# Patient Record
Sex: FEMALE | Race: WHITE
[De-identification: names, ages, dates, MRNs, and addresses within clinical notes are randomized per-mention and may not be internally consistent; named-entity substitution may affect disease eponyms.]

---

## 2021-09-28 ENCOUNTER — HOSPITAL ENCOUNTER (EMERGENCY)
Dept: HOSPITAL 35 - ER | Age: 76
LOS: 1 days | Discharge: PSYCHIATRIC OR SUBSTANCE USE FACILITY | End: 2021-09-29
Payer: COMMERCIAL

## 2021-09-28 VITALS — WEIGHT: 180.01 LBS | BODY MASS INDEX: 33.13 KG/M2 | HEIGHT: 62 IN

## 2021-09-28 DIAGNOSIS — Z88.6: ICD-10-CM

## 2021-09-28 DIAGNOSIS — Z88.5: ICD-10-CM

## 2021-09-28 DIAGNOSIS — Z88.2: ICD-10-CM

## 2021-09-28 DIAGNOSIS — Z20.822: ICD-10-CM

## 2021-09-28 DIAGNOSIS — Z88.9: ICD-10-CM

## 2021-09-28 DIAGNOSIS — F91.9: Primary | ICD-10-CM

## 2021-09-29 ENCOUNTER — HOSPITAL ENCOUNTER (INPATIENT)
Dept: HOSPITAL 35 - SBH | Age: 76
LOS: 8 days | Discharge: HOME | DRG: 885 | End: 2021-10-07
Attending: PSYCHIATRY & NEUROLOGY | Admitting: PSYCHIATRY & NEUROLOGY
Payer: COMMERCIAL

## 2021-09-29 VITALS — SYSTOLIC BLOOD PRESSURE: 111 MMHG | DIASTOLIC BLOOD PRESSURE: 54 MMHG

## 2021-09-29 VITALS — SYSTOLIC BLOOD PRESSURE: 146 MMHG | DIASTOLIC BLOOD PRESSURE: 88 MMHG

## 2021-09-29 VITALS — BODY MASS INDEX: 28.17 KG/M2 | WEIGHT: 165 LBS | HEIGHT: 64 IN

## 2021-09-29 VITALS — DIASTOLIC BLOOD PRESSURE: 82 MMHG | SYSTOLIC BLOOD PRESSURE: 136 MMHG

## 2021-09-29 DIAGNOSIS — F41.9: ICD-10-CM

## 2021-09-29 DIAGNOSIS — Z60.2: ICD-10-CM

## 2021-09-29 DIAGNOSIS — Z88.6: ICD-10-CM

## 2021-09-29 DIAGNOSIS — Z90.710: ICD-10-CM

## 2021-09-29 DIAGNOSIS — Z79.899: ICD-10-CM

## 2021-09-29 DIAGNOSIS — Z23: ICD-10-CM

## 2021-09-29 DIAGNOSIS — K21.9: ICD-10-CM

## 2021-09-29 DIAGNOSIS — E53.8: ICD-10-CM

## 2021-09-29 DIAGNOSIS — Z88.8: ICD-10-CM

## 2021-09-29 DIAGNOSIS — F33.2: Primary | ICD-10-CM

## 2021-09-29 DIAGNOSIS — E89.0: ICD-10-CM

## 2021-09-29 DIAGNOSIS — Z86.16: ICD-10-CM

## 2021-09-29 DIAGNOSIS — I10: ICD-10-CM

## 2021-09-29 DIAGNOSIS — Z88.2: ICD-10-CM

## 2021-09-29 DIAGNOSIS — Z20.822: ICD-10-CM

## 2021-09-29 LAB
CHOLEST SERPL-MCNC: 189 MG/DL (ref ?–200)
FOLATE SERPL-MCNC: 7.6 NG/ML (ref 8.6–58.9)
HDLC SERPL-MCNC: 51 MG/DL (ref 40–?)
LDLC SERPL-MCNC: 123 MG/DL (ref ?–100)
TC:HDL: 3.7 RATIO
TRIGL SERPL-MCNC: 76 MG/DL (ref ?–150)
VIT B12 SERPL-MCNC: 166 PG/ML (ref 193–986)
VLDLC SERPL CALC-MCNC: 15 MG/DL (ref ?–40)

## 2021-09-29 PROCEDURE — 10880: CPT

## 2021-09-29 SDOH — SOCIAL STABILITY - SOCIAL INSECURITY: PROBLEMS RELATED TO LIVING ALONE: Z60.2

## 2021-09-29 NOTE — NUR
New admit to [facility]: Pt transferred from [facility] for admit to [facility]. She
is noted with hx anxiety and depression, HTN, GERD, hypothyroidism. She
reports that her and her  both had covid in Oct last year and that in
February, her  passed away. Increased depression since. Not able to
obtain weight hx d/t pt teary eyed at time of visit. 100% intake at [facility] this
morning. Current BMI 39, obese class II. Follow weight and intake trends for
nutrition intervention needs. Low nutrition risk at this time.

## 2021-09-29 NOTE — NUR
PATIENT HAS BEEN UP, AND OUT ON THE UNIT, AMBULATE WITH ASSIST OF ROLLER
WALKER, GAIT SLIGHTLY UNSTEADY. PATIENT TOOK MORNING MEDICATION WHOLE WITHOUT
DIFFICULTY, SHE IS EATING MEALS, AND DRINKING FLUID WELL. PATIENT DENIES
SUICIDAL/HOMICIDAL IDEATION. SHE RATES DEPRESSION 2/10, "MY  PASSED
AWAY IN FEBRUARY". PATIENT ANXIETY IS "NOT THAT MUCH". SHE DENIES HAVING
PHYSICAL PAIN AT THIS TIME. PATIENT WAS EMOTIONAL DURING 1:1 CONVERSATION.
AFFECT IS SAD, MOOD IS DEPRESSION. NO SIGN OF ACUTE DISTRESS NOTED, WILL
MONITOR FOR SAFETY.
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
## 2021-09-29 NOTE — NUR
PATIENT WAS ADMITTED TO [facility] ON 9/29/21 AT 0433. PT WAS COOPERATIVE WITH
ADMISISON PROCESS. PT SIGNED VOLUNTARY CONSENT TO TREATMENT AND FALL CONTRACT.
PT STATED HER  PASSED AWAY IN FEBRUARY. PT STATED SHE CONTRACTED COVID
IN OCTOBER 2020 AND HAS HAD DEPRESSION AND ANXIETY SINCE. PT ENDORSED SI WITH
NO PLAN. PT CONTRACTED FOR SAFETY WITH RN. PT DENIED HI AND AVH. PT STATED SHE
IS NOT SURE WHEY SHE OVERDOSED AND STATES SHE ONLY TOOK A DOUBLE DOSE OF HER
MEDICATION. PT AMBULATES WITH WALKER BUT IS WEAK. FALL PRECAUTIONS ARE IN
PLACE. VITAL SIGNS OBTAINED; WITHIN NORMAL LIMITS. SKIN ASSESSMENT PERFORMED;
PT HAD A "RED MICHAEL" REMOVED FROM HER LEFT EAR. NO PURULENT DRAINAGE FROM LEFT
EAR; DRESSED WITH TAPE.

## 2021-09-30 VITALS — SYSTOLIC BLOOD PRESSURE: 140 MMHG | DIASTOLIC BLOOD PRESSURE: 81 MMHG

## 2021-09-30 VITALS — SYSTOLIC BLOOD PRESSURE: 152 MMHG | DIASTOLIC BLOOD PRESSURE: 78 MMHG

## 2021-09-30 VITALS — DIASTOLIC BLOOD PRESSURE: 81 MMHG | SYSTOLIC BLOOD PRESSURE: 140 MMHG

## 2021-09-30 LAB
EST. AVERAGE GLUCOSE BLD GHB EST-MCNC: 114 MG/DL
GLYCOHEMOGLOBIN (HGB A1C): 5.6 % (ref 4.8–5.6)

## 2021-09-30 NOTE — NUR
Took over care from night shift staff this am. Client was present in room
sitting on bed. Client expressed that she wanted to go home, and expressed
that she thought her doctor told her that she could go home this weekend.
Nursing informed client that discharges did not happen on weekends and that
only provider could discharge client. Client denies any si/hi/hallucinations
at this time. Mild anxiety expressed. Alert and oriented x3. Bowel sounds
normal. Lung sounds clear bilaterally.

## 2021-09-30 NOTE — NUR
JUDSON and Dr. Rojas met with the Pt.  Pt denied SI/HI.  SW asked Pt about her
concerns with going home.  Pt stated she was aware that she would not go home
today.  Pt asked if SW called her daughter, Mackenzie, today.  SW explained that
we planned to call Mackenzie during this meeting.
 
Dr. Rojas called Mackenzie on speaker phone.  After Dr. Rojas annouced who
was in the room Mackenzie begin to yell very loudly "I was told my mother would
be there for 10 day no visits no phone calls and you are saying she can
leave".  Pt tried to calm Mackenzie down.  Mackenzie yelled at the Pt, " No Fuck
you Mom!" and continued to berate and cuss out JUDSON and Dr. Cervantes.  Dr. Rojas calmly explained that the phone call would be ended due to the
language and yelling.  Also that if it continued we could not include her in
the Pt's treatment while on the unit.  Mackenzie yells, " I have anxiety and I
don't want to be apart of her treatment anymore!".  Phone call was ended.
 
JUDSON completed debriefing with the Pt and offered emotional support.
 
JUDSON will continue to follow.

## 2021-09-30 NOTE — H
[facility]
[address]
[place]   [address]                     HISTORY AND PHYSICAL          
_______________________________________________________________________________
 
Name:       CHANNING KOROMA         Room #:         521A-A      ADM IN  
M.R.#:      [id number]                       Account #:      [id number]  
Admission:  21    Attend Phys:    Ethan Logan DO
Discharge:              Date of Birth:  [birth year]  
                                                          Report #: [id number]
                                                                    [id number] 
_______________________________________________________________________________
THIS REPORT FOR:  
 
cc:  Odalis Bartholomew MD, Emily G. MD Kerstein,Ethan LE DO                                        ~
 
DATE OF SERVICE: 2021
 
INPATIENT PSYCHIATRIC EVALUATION
 
ATTENDING PSYCHIATRIST:  Ethan Logan DO
 
MEDICAL CONSULTANT:  RADHAMES Fields and Antonio Humphrey MD and his hospitalist
team.
 
SOURCES OF INFORMATION:  Records from [facility]; interview with
the patient; collateral from her daughter, Mackenzie Yuan, at [phone].
 
REASON FOR ADMISSION:  Overdose on 53 Seroquel, 30 Xanax, admitted  at [facility].
 
CHIEF COMPLAINT: "I want to sign out."
 
HISTORY OF PRESENT ILLNESS:  This is a 75-year-old obese  female.  The 
patient was admitted on , , at [facility].  Apparently, the 
patient had been diagnosed with depression, on no medication and had been 
hospitalized in 2020 for suicidal ideation, though she denied this.  Per the 
ER record, the patient was sleepy and she reported depression and overwhelmed 
feelings with no hopeless or helpless feelings.  She has poor motivation and 
insight and she has poor sleep and appetite.  The patient denies any active 
suicidal or homicidal ideation; however, she was intentionally overdosed with 
Xanax, Tylenol, lisinopril, and Seroquel.  The patient took all her medication 
overnight.  She was found by her caregiver the next day in the morning.  The 
patient has been depressed for a long time; however, depression has been getting
worse in the last year and a half after she got COVID and her  .  The
patient denied any manic or psychotic features.  She lives alone with in-home 
caregiver.
 
ALLERGIES:  PROCHLORPERAZINE, DROPERIDOL, FLAGYL, MORPHINE SULFATE, 
SULFAMETHOXAZOLE, TORADOL, AND SULFA DRUGS
 
LABORATORY DATA FROM [facility]: White count 5.85, hemoglobin 14.8, 
hematocrit 45, platelet count 149.  Sodium 141, potassium 4.3, chloride 110, 
bicarbonate 24, BUN 18, creatinine 0.6, calcium 9.2, total serum protein 5.8, 
alkaline phosphatase 56, ALT 16, AST 24, glucose 79.
 
 
 
 
[address]   [address]                     HISTORY AND PHYSICAL          
_______________________________________________________________________________
 
Name:       CHANNING KOROMA         Room #:         521A-A      [facility] [place]  
M.R.#:      [id number]                       Account #:      [id number]  
Admission:  21    Attend Phys:    Ethan Loagn DO
Discharge:              Date of Birth:  [birth year]  
                                                          Report #: [id number]
                                                                    [id number] 
_______________________________________________________________________________
 
SOCIAL HISTORY:  The patient is .  She was  from her first 2 
husbands. No smoking.  No vaping.  No alcohol. No drug use history.  She has 
difficulty paying living expenses, food insecurity, worried about running out of
food in last year.  Actually, I do not think those were responded to those last 
few items.
 
Diagnosed with major depressive disorder, severe degree, seen by Dr. Baudilio Nails. 
Additional information from [facility]; she took 53, 25 mg Seroquel and 30, 0.5 
mg Xanax last night around 19:30, so that would be on .  She arrived to the
ER 15-1/2 hours later.
 
SURGICAL HISTORY:  Dilation and curettage of uterus, gallbladder surgery, 
hysterectomy, tubal ligation.
 
MEDICAL HISTORY:  Includes essential hypertension, GERD, hyponatremia, likely 
SIADH, hypothyroidism.  She was somnolent and difficult to arouse at [facility].
 EKG showed QTc 474, .  WA interval 156, nonspecific ST and T-wave 
changes, normal sinus rhythm.
 
Labs showed SARS-CoV-2 negative.  TSH 0.91, sodium 141, potassium 4.1, chloride 
110, bicarbonate 24, anion gap 8, calcium 9.7, glucose 79, total protein 5.8, 
BUN 18, creatinine 0.6.  White blood cell count 5.05, H and H 14.8 and 45, 
platelet count 149.  UDS was positive for benzodiazepines.  Sodium 138, 
potassium 4.3, chloride 105, bicarbonate 25, calcium 9.3, glucose 90, alkaline 
phosphatase 57, ALT 18, AST 26.
 
CURRENT MEDICATIONS:  In the hospital, MiraLax 17 grams daily, lactulose 20 mg 
daily, mirtazapine was started at 22.5 mg at bedtime, melatonin 6 mg at bedtime 
for sleep, losartan 50 mg oral daily for hypertension, Protonix 40 mg oral daily
for GERD, levothyroxine 75 mcg oral daily, otherwise house PRNs.
 
LABORATORY DATA:  Today, triglycerides 76, cholesterol 199, , HDL 51.  
COVID-19 PCR here was again not detected.
 
PHYSICAL EXAMINATION:
VITAL SIGNS:  Today, temperature 36.9, pulse 82, respirations 17, /88, O2 
sat 96%.  BMI 39.  Weight 96.6 kilos, height 155.48 cm.
MUSCULOSKELETAL:  Ambulates with walker, a bit kyphotic.
 
MENTAL STATUS EXAMINATION:  This is a somewhat ill-appearing  female.  
Attention fair.  Concentration fair.  Speech normal in rate, volume, and tone.  
Thought process linear and goal directed.  Thought content focused on 
ameliorating her situation.  Denied suicidal intent or plan.  Denied homicidal 
intent or plan.  Some helplessness. Some hopelessness.  Memory not formally 
tested.  Insight and judgment were limited.  Fund of knowledge is average.
 
 
 
[facility]
[address]
[place]   [address]                     HISTORY AND PHYSICAL          
_______________________________________________________________________________
 
Name:       CHANNING KOROMA         Room #:         521A-A      ADM IN  
.R.#:      [id number]                       Account #:      [id number]  
Admission:  21    Attend Phys:    Ethan Logan DO
Discharge:              Date of Birth:  [birth year]  
                                                          Report #: [id number]
                                                                    [id number] 
_______________________________________________________________________________
 
 
Some additional notes; she has an associate's degree from [employer].  
Born and raised in [place], [place].  Her [employer]ism is [employer].  her primary care physician is who treats her psychiatric issues.
 
FORMULATION:  A 75-year-old  female, admitted status post intentional 
overdose to end her life.  The patient has had a difficult year or so including 
death of her , multiple hospitalizations, sequela from COVID.  She 
appears to be a long hauler.
 
PLAN:  The patient is admitted voluntarily to [facility] 
Behavioral Health Unit. Evaluate, stabilize, obtain collateral. Regarding next 
steps for this patient, she will need to complete the milieu therapy.  I think 
she would be a reasonable candidate for a PHP program.  I discussed this with 
her.  We will get her going on mirtazapine, which has been successful in the 
past I explained of, Xanax due to the overdose potential.
 
Time spent on this case approximately 60-70 minutes, greater than 50% of the 
time spent in review of records and coordination of care.
 
STRENGTHS:  She is insured, some family support.
 
WEAKNESSES:  Poor coping skills, multiple morbidities.
 
 
 
 
 
 
 
 
 
 
 
 
 
 
 
 
 
 
 
 
  <ELECTRONICALLY SIGNED>
   By: Ethan Logan DO        
  21     2226
D: 21 1336                           _____________________________________
T: 21 1652                           Ethan Logan DO          /nt

## 2021-10-01 VITALS — SYSTOLIC BLOOD PRESSURE: 138 MMHG | DIASTOLIC BLOOD PRESSURE: 62 MMHG

## 2021-10-01 VITALS — SYSTOLIC BLOOD PRESSURE: 158 MMHG | DIASTOLIC BLOOD PRESSURE: 91 MMHG

## 2021-10-01 VITALS — DIASTOLIC BLOOD PRESSURE: 91 MMHG | SYSTOLIC BLOOD PRESSURE: 158 MMHG

## 2021-10-01 LAB
BACTERIA-REFLEX: (no result) /HPF
BILIRUB UR-MCNC: NEGATIVE MG/DL
COLOR UR: YELLOW
KETONES UR STRIP-MCNC: (no result) MG/DL
RBC # UR STRIP: (no result) /UL
RBC #/AREA URNS HPF: (no result) /HPF
SP GR UR STRIP: 1.01 (ref 1–1.03)
SQUAMOUS: (no result) /LPF (ref 0–3)
URINE CLARITY: (no result)
URINE GLUCOSE-RANDOM*: NEGATIVE
URINE LEUKOCYTES-REFLEX: (no result)
URINE NITRITE-REFLEX: POSITIVE
URINE PROTEIN (DIPSTICK): NEGATIVE
UROBILINOGEN UR STRIP-ACNC: 0.2 E.U./DL (ref 0.2–1)

## 2021-10-01 NOTE — NUR
JUDSON was able to speak with the Pt daughter, Leonor [phone].  Leonor did
state she was not listed on the Pt's DPOA,  However Leonor was able to offer
her concerns about the Pt.  Leonor stated that the Pt has been hospitalized
prior and when discharged Pt refuses mental health services.  Pt wont leave
her home until she has an appointment.  Pt has in home aides 8 hours a day 5
days a week.  Pt dosent do well when left alone a home.  Pt's anxiety
increases and Pt makes sucidal statements.  This has increase since the
passing of the Pt's .  The family has tried to work with Pt on getting
into a nursing home however Pt refuses to go.  Leonor had no other questions
or concerns at this time.
 
JUDSON will continue to follow
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
## 2021-10-01 NOTE — NUR
RESUMMED CARE FROM OVERNIGHT SHIFT THIS AM, PATIENT IN ROOM LYING QUIET.
PATIENT TOOK MEDICATION ATE SMALL AMOUNT OF BREAKFAST, PATIENTS AFFECT FLAT.
PATIENT DENIES SI/HI/AH/VH AT PRESENT. PATIENTS ABDOMEN SOFT BOWEL SOUNDS
PRESENT PATIENTS LUNGS CLEAR. PATIENT IS WORRIED THAT HER DAUGHTER WILL NOT BE
ABLE TO HELP HER AT HOME. PATIENT THIS AM COMPLAINED OF NAUSEA AND UPSET
STOMACH. PATIENT GIVEN ZOFRAN 4 MG AND SPRITE PATIENT CAME OUT OF ROOM AND WAS
WALKING THE HALLS FOR EXERCISE. PATIENT HAS NOT DISPLAYED ANY BEHAVIORS WILL
CONTINUE TO MONITOR PATIENT FOR SAFETY AND BEHAVIORS.

## 2021-10-02 VITALS — DIASTOLIC BLOOD PRESSURE: 86 MMHG | SYSTOLIC BLOOD PRESSURE: 165 MMHG

## 2021-10-02 VITALS — SYSTOLIC BLOOD PRESSURE: 141 MMHG | DIASTOLIC BLOOD PRESSURE: 69 MMHG

## 2021-10-02 VITALS — DIASTOLIC BLOOD PRESSURE: 69 MMHG | SYSTOLIC BLOOD PRESSURE: 141 MMHG

## 2021-10-02 NOTE — NUR
Pt called nursing at 3:28pm, stating that her back hurt. Pt stated that it
hurt to move around in bed. Repositioning was done to help alleviate pain at
this time. Pt has already received tylenol prn, and new order for tylenol
1000mg was entered after approval by provider. Pt stated that she felt like
she should move, but didn't want to. Pt was encouraged to rest at this time.
No further discomfort noted.

## 2021-10-02 NOTE — NUR
PT has been complaining of backpain during this shift in addition to her
nausea. Pt was given tylenol to help with backpain, but states that the
tylenol wore off and that it is no longer helpful. As of 12:51pm, pt stated,
"this is worse than labor pains, because at least labor pains end." Nursing
requested lidocaine patch from rounding provider to help alleviate pt pain. Pt
is currently in bed with two side rails up for safety.

## 2021-10-02 NOTE — NUR
Resummed care from overnight staff this am. Pt was sitting in her room,
stating that she was nauseous. Pt was positioned in side laying position, and
encouraged to eat bland foods as pt had been given prn zofran earlier in the
am. Pt oriented 4x. Pt denies si/hi/hallucinations at this time. Lung sounds
clear. Bowel sounds were normal and present. No further concerns at this time.
Will continue to monitor for patient care and safety.

## 2021-10-02 NOTE — NUR
PATIENT RATES HER DEPRESSION A 7/10 AND HER ANXIETY A 3/10 BUT DENIES SI/HI.
 PASSED AWAY ABOUT A YEAR AGO AND PATIENT HAS BEEN IN AND OUT OF
HOSPITALS FOR VARIOUS MEDICAL REASONS AND SHE FEELS THAT IT HAS BEEN VERY
STRESSFUL. PATIENT GIVEN PRN VISTRAIL 25MG FOR ANXIETY AS HS AND ZOFRAN LATER
FOR NAUSEA. PATIENT AMBULATES WITH WALKER, IS CONTINENT OF BOWEL AND BLADDER.

## 2021-10-02 NOTE — NUR
Pt called for nursing again at 12:58pm. Nursing informed pt that nursing was
still waiting on any additional orders provider would give. Pt stated "why
don't you just shot me." Nursing reassured pt during this time; no suicidal
intent was found.

## 2021-10-03 VITALS — SYSTOLIC BLOOD PRESSURE: 135 MMHG | DIASTOLIC BLOOD PRESSURE: 73 MMHG

## 2021-10-03 VITALS — DIASTOLIC BLOOD PRESSURE: 73 MMHG | SYSTOLIC BLOOD PRESSURE: 135 MMHG

## 2021-10-03 NOTE — NUR
PT ALERT AND ORIENTED TIMES FOUR. VSS. PT C/O PAIN AND ANXIETY BOTH PRN
MEDICATIONS GIVEN WITH GOOD RELEIF. PT DENIES SI/HI. PT OBSERVED HAVING AH/VH
STATES SHE HEARS AND SEES "PEOPLE FROM THE RADIO STATION SINGING AND DANCING
IN MY ROOM" PT DID ATTEND GROUPS THIS SHIFT, AND INTERACTS WELL WITH STAFF AND
PEERS. WILL CONTINUE TO MONITOR.

## 2021-10-03 NOTE — NUR
Check in with pt. during group (Protective Factors).  Pt. was in a pleasant
mood.  Pt. identified several protective factors including a supportive family
consisting of her two daughters and their children.  The pt. has a positive
self esteem which she expressed obtaininig through working and raising her
children.  This has given her a sense of accomplishment.  The pt's children
also serves as the pt's sense of purpose.  The pt. did identify difficulty in
healthy thinking.  She stated that she could sometimes be hard on herself.
The pt. is looking forward to discharging home.  She states that she has
caregivers in the home; however, they will not be present in the home all day.
 The pt. is looking forward to living as independent of a lifestyle that she
can.

## 2021-10-03 NOTE — NUR
PATIENT REMAINS SOMATIC AND MEDICATION SEEKING AND HAS BEEN UP AND DOWN ALL
NIGHT. PATIENT IS MEDICATION COMPLIANT, ALERT AND ORIENTED X 4, AMBULATES WITH
WALKER. PATIENT REMAINS DEPRESSED WITH ANXIETY, DENIES SI/HI.

## 2021-10-04 VITALS — DIASTOLIC BLOOD PRESSURE: 82 MMHG | SYSTOLIC BLOOD PRESSURE: 151 MMHG

## 2021-10-04 VITALS — DIASTOLIC BLOOD PRESSURE: 90 MMHG | SYSTOLIC BLOOD PRESSURE: 151 MMHG

## 2021-10-04 NOTE — NUR
PATIENT CARE WAS RESUMED AT 1900. SHE IS ALERT AND ORIENTED AMBULATES WITH
WALKER. SHE IS ABLE TO VERBALIZE HER CONCERNS. LUNGS ARE CLEAR, BS ACTIVE X4
QUADS. SHE DENIES PAINS/SI/HI. SHE SAID THAT MUSIC IS PLAYING IN HER ROOM.SHE
TOOK HER PILLS WHOLE.C/O NAUSEA AND PRN ZOFRAN GIVE WITH SOME GOOD EFFECT. Q
12MINUTES CHECK IS ONGOING. BED IS LOW AND LOCKED,CONTINUE TO MONITOR

## 2021-10-04 NOTE — NUR
RESUMMED CARE FROM OVERNIGHT SHIFT THIS AM, PATIENT IN ROOM ASKING FOR ANXIETY
MEDICATION. PATIENT IS VERY SOMATIC AND CONSTANTLY ASKING FOR PAIN OR ANXIETY
MEDICATION. PATIENT ALERT ORIENTED TIMES 4 PATIENT DENIES SI/AH/VH; SHE
STATES ANXIETY 7 AND DEPRESSION A 7. PATIENTS ABDOMEN SOFT BOWEL SOUNDS
PRESENT. PATIENT STATES LUNGS CLEAR PATIENT PARTICIPATES IN GROUP, I HAD TO
TALK WITH PATIENT AND EXPLAIN TO HER SHE NEEDS TO WALK MORE. PATIENT COMPLAINS
OF BACK PAIN BUT WANTS TO LIE DOWN ALL THE TIME. WILL CONTINUE TO MONITOR
PATIENT FOR SAFETY AND BEHAVIORS.

## 2021-10-05 VITALS — SYSTOLIC BLOOD PRESSURE: 146 MMHG | DIASTOLIC BLOOD PRESSURE: 66 MMHG

## 2021-10-05 VITALS — SYSTOLIC BLOOD PRESSURE: 139 MMHG | DIASTOLIC BLOOD PRESSURE: 77 MMHG

## 2021-10-05 NOTE — NUR
PATIENT GIVEN MED FOR ANXIETY PRN, ZOFRAN PO, CEPHALEXIN 500MG, AND TYLENOL
1000MG SHE REQUESTED AND HER AM MEDS AT 0530. PATIENT ASSISTED TO BATHROOM.
PATIENT BECOMING FUSSIER AND CRYING OUT FOR NURSE. SHE MIMICS EVERYTHING HER
ROOM MATE DOES. IF ROOM MATE ASKS FOR TYLENOL THEN SHE NEEDS TYLENOL. SHE WAS
UPSET BECAUSE HER HOB WAS NOT ELEVATED LIKE HER ROOM MATES. I RAISED IT FOR
HER AND ASKED IF SHE WAS COMFORTABLE WITH IT THERE. SHE TURNED HER HEAD TOWARD
ROOM MATE BED AND ASKED "IS IT AT SAME LEVEL AS HERS, I WANT IT THE SAME AS
HERS."  I SAID IT IS BUT I WANT TO MAKE SURE IT'S COMFORTABLE FOR YOU. SHE
SAID IT WAS. PATIENT THEN STARTED CRYING OUT. "I'M SCARED, I'M SCARED. I DON'T
KNOW WHAT I AM TO BE DOING." SHE STARTED HYPERVENTILATING. I HAD HER TAKE SOME
SLOW DEEP BREATHS AND TO PICTURE A HAPPY PLACE IN HER MIND. SHE KEPT CALLING
OUT I'M SCARED AND HYPERVENTILATING. I THEN TOLD HER I HAD ALREADY GIVEN HER
ANXIETY MED 45 MINUTES AGO. SHE SUDDENLY BREATHED NORMAL AND SAID IN A NORMAL
VOICE, "OH YOU DID?" AND CALMED DOWN. HER COMPLAINTS PHYSICALLY KEEP MOUNTING.
SHE ADDS MORE COMPLAINTS AS HER ROOM MATE DOES AND/OR IF ANXIETY INCREASES.
SHE HAS TALKED TO HER ROOM MATE ALL EVENING AND HAS ONLY HAD 2.8 HOURS OF
SLEEP. PLACED BED ALARM WITHOUT HER NOTICE AND BED IN LOW POSITION. TOLD HER
SHE NEEDED TO LAY STILL AND LET HER MEDS WORK AND REST.

## 2021-10-05 NOTE — NUR
Assumed pt care at 0700. Pt was alert and oriented x4. Assessments completed,
vss. Lungs clear, active bowel sound. Denies si/hi, denies pain. Calm and
cooperative with care. Took meds whole with thin  liquid, no difficulty noted.
AmBUlates with a walker. PRN Zofran administered for nausea. Pt was irritable
upon assessments. Continent of bowel and bladder. Makes needs known to staff.
Participated in afternoon group. At this time pt is in the her room resting.
Will continue to monitor.

## 2021-10-05 NOTE — NUR
JUDSON met with Pt.  SW talked to Pt about the suicide prevention program through
[facility].  Pt still continues to be intrested and willing to participate in
the program.  Pt states she feels ready for discharge.
Pt continues to deny SI/HI, AH/VH.
 
JUDSON was able to fax a referral to suicide prevention program at [facility].
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
## 2021-10-06 VITALS — DIASTOLIC BLOOD PRESSURE: 75 MMHG | SYSTOLIC BLOOD PRESSURE: 130 MMHG

## 2021-10-06 VITALS — SYSTOLIC BLOOD PRESSURE: 147 MMHG | DIASTOLIC BLOOD PRESSURE: 74 MMHG

## 2021-10-06 NOTE — NUR
PT ALERT AND ORIENTED TIMES FOUR. VSS. PT C/O PAIN AND ANXIETY BOTH PRN
MEDICATIONS GIVEN WITH GOOD RELEIF. PT DENIES SI/HI/AH/VH. PT ATTENED GROUPS,
INTERACTS WELL WITH STAFF AND PEERS. PT TOLERATES MEDS AND MEALS. PT UP WALING
AROUND THE UNIT WITH WALKER STEADY GAIT. PT PROGRESSING TOWRADS POC GOALS.

## 2021-10-06 NOTE — NUR
PATIENT AWOKE AT 0300 WITH C/0 OF LOW BACK PAIN WHERE HER HERNIATED DISC ARE.
ASSISTED PATIENT WITH WALKER TO TOILET TO VOID AND THEN GAVE HER TYLENOL
100OMG PO. HELPED HER GET COMFORTABLE IN BED. HARD FOR PATIENT TO RELAX D/T
TIGHTNESS IN THE BACK AND SHE IS BECOMING MORE ANXIOUS. HYDROXAZINE PRN GIVEN
FOR ANXIETY. AWAITING CALL BACK FROM DR LANGE TO SEE IF WE CAN GET A MUSCLE
RELAXER FOR HER OR SOMETHING STRONGER FOR PAIN.

## 2021-10-06 NOTE — NUR
Spoke with Pt concerning discharge.  Pt informed that her home health workers
could pick her up and transport her home.  Pt was excited about being
discharged due to her birthday being the day of discharge.
 
JUDSON was marcia to speak with Rsoe at [facility].  Rose confirmed she can
have staff picked the Pt up at discharge.  Discharge set for 10/7/2021 @ 1500.
 
JUDSON contacted Leonor and informed of the Pt's discharge.

## 2021-10-06 NOTE — NUR
RECEIVED ORDER FROM DR LANGE FOR FLEXERIL 10MG PO ONE TIME FOR PATIENT BACK
PAIN AND TIGHTNESS FROM BULDGING DISCS IN HER LOWER BACK. PATIENT GIVEN MED
AND REPOSITIONED IN BED FOR COMFORT. PATIENT HAD PARTIAL PAIN RELIEF FROM
TYLENOL 1000MG AND WENT FROM A 9 DOWN TO A 5 OUT OF 10. PATIENT EDUCATED ON
FLEXERIL THAT IT CAN CAUSE DROWSINESS AND INCREASE CHANCE OF FALLING AND TO
WAIT FOR HELP IF NEEDS TO GET UP. PT BACK TO RESTING.

## 2021-10-06 NOTE — NUR
JUDSON spoke with Denisa Hernandez at [facility], [phone].  Denisa confirmed
recieving the referral.  Denisa stated they would call the Pt the day of
discharge however would not be able to start CM services until the following
week.
 
JUDSON will provide Pt contact information for [facility] Suicide program at
discharge.

## 2021-10-07 VITALS — DIASTOLIC BLOOD PRESSURE: 73 MMHG | SYSTOLIC BLOOD PRESSURE: 144 MMHG

## 2021-10-07 NOTE — NUR
PATIENT CARE RESUMMED AT 0700. PATIENT IS A&O*4, LUNG SOUNDS CLEAR, ABDOMEN
SOFT UPON PALPATION, WITH BOWEL SOUNDS PRESENT. PATIENT DENIED BEING
DEPRESSED BUT STATED SHE WAS ANXIOUS. PATIENT RATED HER ANXIETY AN 8 ON A
SCALE OF 0-10. PATIENT ALSO STATED SHE WAS HAVING SOME PAIN IN HER BACK
STATING IT WAS AN 8 ON A SCALE ON 0-10. PATIENT STATED SHE WAS ANXIOUS
BECAUSE OF BEING DISCHARGED TODAY. PATIENT STATED THOUGH SHE IS ANXIOUS SHE IS
EXCITED. PATIENT DENIES SI/HI/AH/VH. PATIENT STATED SHE HAD A BOWEL MOVEMENT
THIS MORNING WHEN SHE WOKE UP, THUS DENYING WANTING HER LAXATIVE THIS MORNING.
PATIENT DID INFORM NURSING SHE WAS NO LONGER HAVING PAIN/DISCOMFORT UPON
URINIATION. FALL PREVENTIONS ARE IN PLACE. WILL CONTINUE TO MONITOR PATIENT
FOR BEHAVIORS AND SAFETY.

## 2021-10-07 NOTE — NUR
ASSUMED CARE ON 10/06/21 @ 1900, COOPERATIVE WITH CARE, PLEASANT AFFECT
NOTED.A&OX4 TYLENOL 1000 PROVIDED FOR BACK PAIN @ 2100 AND 0300. AMBULATES
WITH A STEADY GAIT WITH A WALKER. WILL CONTINUE TO MONITOR FOR SAFETY AND
COMFORT AS PER UNIT PROTOCOL.

## 2021-10-07 NOTE — NUR
NURSING EDUCATION PROVIDED TO PATIENT POST DISCHARGE BY NURSING STAFF AT 1115.
PATIENT UNDERSTOOD AND TAUGHT BACK INFORMATION. PATIENT SIGNED DISCHARGE
PAPERWORK AND IS AWAITING RIDE. PATIENT BEING DISCHARGE TO HER HOME,
MEDICATIONS HAVE BEEN SENT OUT TO PATIENTS [facility].

## 2021-10-10 NOTE — D
[facility]
[address]
[place]   [address]                     DISCHARGE SUMMARY             
_______________________________________________________________________________
 
Name:       CHANNING KOROMA         Room #:         [address]      [facility] [place]  
M.R.#:      [id number]                       Account #:      [id number]  
Admission:  09/29/21    Attend Phys:    Ethan Logan DO
Discharge:  10/07/21    Date of Birth:  [birth year]  
                                                          Report #: [id number]
                                                                    [id number] 
_______________________________________________________________________________
THIS REPORT FOR:  
 
cc:  Odalis Bartholomew MD, Emily G. MD Kerstein,Ethan LE DO                                        ~
DATE OF SERVICE: 10/07/2021
 
INPATIENT PSYCHIATRIC DISCHARGE SUMMARY
 
ATTENDING PSYCHIATRIST:  Ethan Logan DO
 
MEDICAL CONSULTANT:  Antonio Humphrey MD
 
DISCHARGE DIAGNOSES:  Major depressive disorder, single episode, recurrent, 
severe degree.
 
MEDICAL COMORBIDITIES:  As follows, hypertension, stable, status post 
thyroidectomy, on levothyroxine, status post COVID pneumonitis in 10/2020.  
Also, urinary tract infection.  Culture done on 10/01, ____ 10/03 grew E. coli, 
on cephalexin.  The patient is discharging to her home.  Aftercare set up by 
 rediscovered.  We will hold patient the day of discharge, would 
not be able to set  until the following week.  Referral 
made for suicide prevention program. [facility] is involved with this 
patient for home care.
 
DIET AT DISCHARGE:  Regular.
 
ACTIVITY LEVEL:  As tolerated.
 
No alcohol, no illicit drugs.  The patient was given crisis suicide hotline 
information.  Return to care as well for SI, HI, chest pain, fever, swelling, 
edema and trouble breathing.
 
DISCHARGE MEDICATIONS:  Cephalexin 500 mg oral twice daily, 15 more doses; 
Losartan 50 mg oral daily for hypertension, Rx given for #30; Mirtazipine
30 mg oral
at bedtime, Rx given for #30; lactulose 20 grams oral daily for bowel motility; 
pantoprazole 40 mg oral daily for GERD; levothyroxine 75 mcg oral daily for 
thyroid replacement; vitamin B12 of 1000 mcg oral daily for supplementation; 
folic acid 1 mg oral daily for supplementation.
 
DISCHARGE LABORATORY DATA:  This admission COVID-19 serology was not detected on
10/01-10/03.  Urine had numerous positives and as stated with E. coli on 
culture.  Other laboratories: Hemoglobin A1c was 5.6.  B12 of 166, which is low.
 Folate 7.6, which is low.  TSH 2.082.  Total cholesterol 180, , HDL 51.
 
 
 
 
[address]   [address]                     DISCHARGE SUMMARY             
_______________________________________________________________________________
 
Name:       CHANNING KOROMA         Room #:         [address]A      [facility] [place]  
[facility]#:      [id number]                       Account #:      [id number]  
Admission:  09/29/21    Attend Phys:    Ethan Logan DO
Discharge:  10/07/21    Date of Birth:  [birth year]  
                                                          Report #: [id number]
                                                                    [id number] 
_______________________________________________________________________________
REASON FOR ADMISSION:  Back at the end of September, 76-year-old female sent to 
us from [facility].  She was intentionally overdosed on 53 
Seroquel and 30 Xanax, admitted on 09/26.
 
HOSPITAL COURSE:  The patient was admitted to Geriatric Psychiatry Unit.  The 
patient was started on mirtazapine due to success from previous trial. I
started at 
22.5 mg and then 
increased to 30 mg.  During the course of admission, the patient's affect 
brightened. Her daughter became supportive by end of her stay. 
She participated in activities.  No restraints were required this
admission. At time of 
discharge patient was stable for step-down.
 
VITAL SIGNS:  Temperature afebrile,
pulse 71, respirations 18, /70, O2 sat
94%.
 
MUSCULOSKELETAL:  Assisted gait with walker, wearing glasses, good dressed.
 
MENTAL STATUS EXAMINATION:  Well-developed, age-appearing  female.  
Attention fair.  Concentration fair.  Speech normal in rate, amount and tone.  
Thought process:  Linear and goal directed.  Thought content: Focused on 
discharge.  Denied suicidal or homicidal ideation, auditory, visual, or tactile 
hallucinations.  Mood was good and affect was congruent, euthymic.  States she 
feels like she is improved this admission.  Memory not formally tested.  Insight
fair, judgment fair.  Fund of knowledge average range.
 
Prognosis for this patient is fair.  She continues in psychiatric treatment and 
continues with home support services.  She also will need to keep up 
socialization.
 
 
 
 
 
 
 
 
 
 
 
 
 
  <ELECTRONICALLY SIGNED>
   By: Ethan Logan DO        
  10/10/21     2020
D: 10/07/21 1946                           _____________________________________
T: 10/07/21 2156                           Ethan Logan DO          /nt
